# Patient Record
Sex: MALE | Race: WHITE
[De-identification: names, ages, dates, MRNs, and addresses within clinical notes are randomized per-mention and may not be internally consistent; named-entity substitution may affect disease eponyms.]

---

## 2018-12-29 ENCOUNTER — HOSPITAL ENCOUNTER (EMERGENCY)
Dept: HOSPITAL 92 - ERS | Age: 33
Discharge: HOME | End: 2018-12-29
Payer: SELF-PAY

## 2018-12-29 DIAGNOSIS — J20.9: Primary | ICD-10-CM

## 2018-12-29 DIAGNOSIS — F17.210: ICD-10-CM

## 2018-12-29 DIAGNOSIS — R50.9: ICD-10-CM

## 2018-12-29 LAB
ALBUMIN SERPL BCG-MCNC: 4.4 G/DL (ref 3.5–5)
ALP SERPL-CCNC: 90 U/L (ref 40–150)
ALT SERPL W P-5'-P-CCNC: 16 U/L (ref 8–55)
ANION GAP SERPL CALC-SCNC: 11 MMOL/L (ref 10–20)
AST SERPL-CCNC: 19 U/L (ref 5–34)
BASOPHILS # BLD AUTO: 0.1 THOU/UL (ref 0–0.2)
BASOPHILS NFR BLD AUTO: 0.5 % (ref 0–1)
BILIRUB SERPL-MCNC: 0.4 MG/DL (ref 0.2–1.2)
BUN SERPL-MCNC: 11 MG/DL (ref 8.9–20.6)
CALCIUM SERPL-MCNC: 9.4 MG/DL (ref 7.8–10.44)
CHLORIDE SERPL-SCNC: 105 MMOL/L (ref 98–107)
CO2 SERPL-SCNC: 24 MMOL/L (ref 22–29)
CREAT CL PREDICTED SERPL C-G-VRATE: 0 ML/MIN (ref 70–130)
EOSINOPHIL # BLD AUTO: 0.1 THOU/UL (ref 0–0.7)
EOSINOPHIL NFR BLD AUTO: 1 % (ref 0–10)
GLOBULIN SER CALC-MCNC: 3.5 G/DL (ref 2.4–3.5)
GLUCOSE SERPL-MCNC: 102 MG/DL (ref 70–105)
HGB BLD-MCNC: 13.3 G/DL (ref 14–18)
LYMPHOCYTES # BLD: 2 THOU/UL (ref 1.2–3.4)
LYMPHOCYTES NFR BLD AUTO: 17.8 % (ref 21–51)
MCH RBC QN AUTO: 31.8 PG (ref 27–31)
MCV RBC AUTO: 93.5 FL (ref 78–98)
MONOCYTES # BLD AUTO: 0.7 THOU/UL (ref 0.11–0.59)
MONOCYTES NFR BLD AUTO: 5.9 % (ref 0–10)
NEUTROPHILS # BLD AUTO: 8.2 THOU/UL (ref 1.4–6.5)
NEUTROPHILS NFR BLD AUTO: 74.8 % (ref 42–75)
PLATELET # BLD AUTO: 282 THOU/UL (ref 130–400)
POTASSIUM SERPL-SCNC: 4.1 MMOL/L (ref 3.5–5.1)
RBC # BLD AUTO: 4.18 MILL/UL (ref 4.7–6.1)
SODIUM SERPL-SCNC: 136 MMOL/L (ref 136–145)
WBC # BLD AUTO: 11 THOU/UL (ref 4.8–10.8)

## 2018-12-29 PROCEDURE — 80053 COMPREHEN METABOLIC PANEL: CPT

## 2018-12-29 PROCEDURE — 36415 COLL VENOUS BLD VENIPUNCTURE: CPT

## 2018-12-29 PROCEDURE — 87430 STREP A AG IA: CPT

## 2018-12-29 PROCEDURE — 87804 INFLUENZA ASSAY W/OPTIC: CPT

## 2018-12-29 PROCEDURE — 96372 THER/PROPH/DIAG INJ SC/IM: CPT

## 2018-12-29 PROCEDURE — 87081 CULTURE SCREEN ONLY: CPT

## 2018-12-29 PROCEDURE — 85025 COMPLETE CBC W/AUTO DIFF WBC: CPT

## 2018-12-29 PROCEDURE — 71045 X-RAY EXAM CHEST 1 VIEW: CPT

## 2018-12-29 NOTE — RAD
PORTABLE AP CHEST XRAY:

 

DATE: 12/29/2018.

 

HISTORY: 

Cough, chills, body aches.

 

COMPARISON: 

None available.

 

FINDINGS: 

There is suboptimal evaluation of the retrocardiac region of the left lung base due to technique.  Th
ere is a questionable patchy density at the left lung base, but this may be artifactual.  However, gi
peyton that pneumonia could not be entirely excluded, a PA and lateral chest x-ray is recommended.  The 
lungs are otherwise clear.  The cardiac silhouette and pulmonary vasculature are within normal limits
.  Osseous structures are intact.

 

IMPRESSION: 

Questionable patchy density left lung base.  However, this may be artifactual due to under penetrated
 technique and depth of inspiration; a PA and lateral chest x-ray is recommended for further evaluati
on.

 

POS: ARSLAN